# Patient Record
Sex: MALE | Race: BLACK OR AFRICAN AMERICAN | URBAN - METROPOLITAN AREA
[De-identification: names, ages, dates, MRNs, and addresses within clinical notes are randomized per-mention and may not be internally consistent; named-entity substitution may affect disease eponyms.]

---

## 2022-02-15 ENCOUNTER — VIRTUAL VISIT (OUTPATIENT)
Dept: URGENT CARE | Facility: CLINIC | Age: 27
End: 2022-02-15

## 2022-02-15 DIAGNOSIS — S69.91XA WRIST INJURY, RIGHT, INITIAL ENCOUNTER: Primary | ICD-10-CM

## 2022-02-15 PROCEDURE — 99203 OFFICE O/P NEW LOW 30 MIN: CPT | Mod: 95 | Performed by: PHYSICIAN ASSISTANT

## 2022-02-15 NOTE — PROGRESS NOTES
Boris is a 26 year old who is being evaluated via a billable video visit.      Video Start Time: 4:12 PM    Assessment & Plan     Wrist injury, right, initial encounter    I will write a work note stating that he has normal function and can return. He will need an in person visit in clinic if he is needing any specific paperwork completed.       Barbara Summers PA-C  Virtual Urgent Care  [facility]    Subjective   Boris is a 26 year old who presents for the following health issues : wrist injury    HPI - Patient reports that he was playing basketball after work 7 days ago and that he landed wrong on the right wrist. He went to work the next day but was unable to perform his duties due to wrist soreness so went home. He says that HR told him to take disability time and have now told him he needs a note to return to work. He says that he is no longer having significant pain with use of the wrist, he has no limitations of strength or ROM.       Review of Systems   Constitutional, HEENT, cardiovascular, pulmonary, gi and gu systems are negative, except as otherwise noted.      Objective           Vitals:  No vitals were obtained today due to virtual visit.    Physical Exam   GENERAL: Healthy, alert and no distress  EYES: Eyes grossly normal to inspection.  No discharge or erythema, or obvious scleral/conjunctival abnormalities.  MS: RUE exam shows normal strength and muscle mass, no deformities, no evidence of joint effusion and ROM of all joints is normal  SKIN: Visible skin clear. No significant rash, abnormal pigmentation or lesions.  NEURO: Cranial nerves grossly intact.  Mentation and speech appropriate for age.  PSYCH: Mentation appears normal, affect normal/bright, judgement and insight intact, normal speech and appearance well-groomed.      Video-Visit Details    Type of service:  Video Visit    Video End Time:4:20 PM    Originating Location (pt. Location):  Home    Distant Location (provider location):  [facility]     Platform used for Video Visit: Maureen

## 2022-02-15 NOTE — LETTER
[facility]  [address] [address]  Phone: [phone]    February 15, 2022        Boris So  [address] [place] [id number]          To whom it may concern:    RE: Boris So    Patient was seen and treated today at our clinic. He reports having had a wrist injury outside of work 1 week ago. At this point he is having normal range of motion and minimal residual pain so should be ok to return to work without any restrictions.     Please contact me for questions or concerns.      Sincerely,    Barbara Summers PA-C  [facility]

## 2022-03-20 ENCOUNTER — HEALTH MAINTENANCE LETTER (OUTPATIENT)
Age: 27
End: 2022-03-20

## 2022-09-11 ENCOUNTER — HEALTH MAINTENANCE LETTER (OUTPATIENT)
Age: 27
End: 2022-09-11

## 2023-04-30 ENCOUNTER — HEALTH MAINTENANCE LETTER (OUTPATIENT)
Age: 28
End: 2023-04-30

## 2024-05-04 ENCOUNTER — HEALTH MAINTENANCE LETTER (OUTPATIENT)
Age: 29
End: 2024-05-04